# Patient Record
Sex: FEMALE | Race: WHITE | URBAN - METROPOLITAN AREA
[De-identification: names, ages, dates, MRNs, and addresses within clinical notes are randomized per-mention and may not be internally consistent; named-entity substitution may affect disease eponyms.]

---

## 2022-06-06 ENCOUNTER — TELEPHONE (OUTPATIENT)
Dept: INTERNAL MEDICINE CLINIC | Facility: CLINIC | Age: 72
End: 2022-06-06

## 2022-06-06 NOTE — TELEPHONE ENCOUNTER
Ok to refill thru me should have had rx's transmitted from [facility], nursing believes she is going to follow-up with me in the office from now on, lets reach out to her see exactly what she needs, pend the prescriptions and we will send them in, encouraged her to make an appointment as well, if we have the means to do a TCM follow-up that is a good idea as well, but I still need to place the discharge summary in

## 2022-06-06 NOTE — TELEPHONE ENCOUNTER
[address] Meza requesting refills for:  Lisinopril  Amlodipine   Rosuvastatin   Metoprolol  Letrozole  Tasked to Dr Rg Manjarrez

## 2022-07-28 ENCOUNTER — TELEPHONE (OUTPATIENT)
Dept: INTERNAL MEDICINE CLINIC | Facility: CLINIC | Age: 72
End: 2022-07-28

## 2022-07-28 NOTE — TELEPHONE ENCOUNTER
Received a fax from Con[facility] [address] requesting a refill for Letrozole 2.5 mg Tablets. Request for refills came in in June and patient was called ten times. No answer. No voicemail set up. No message left.

## 2022-07-29 RX ORDER — LETROZOLE 2.5 MG/1
2.5 TABLET, FILM COATED ORAL DAILY
COMMUNITY
Start: 2022-06-04